# Patient Record
Sex: MALE | Race: WHITE | URBAN - METROPOLITAN AREA
[De-identification: names, ages, dates, MRNs, and addresses within clinical notes are randomized per-mention and may not be internally consistent; named-entity substitution may affect disease eponyms.]

---

## 2023-02-20 ENCOUNTER — OFFICE VISIT (OUTPATIENT)
Dept: ORTHOPEDIC SURGERY | Age: 58
End: 2023-02-20

## 2023-02-20 VITALS — BODY MASS INDEX: 27.17 KG/M2 | HEIGHT: 73 IN | WEIGHT: 205 LBS

## 2023-02-20 DIAGNOSIS — M19.011 DEGENERATIVE JOINT DISEASE OF RIGHT ACROMIOCLAVICULAR JOINT: ICD-10-CM

## 2023-02-20 DIAGNOSIS — S43.401A SPRAIN OF RIGHT SHOULDER, UNSPECIFIED SHOULDER SPRAIN TYPE, INITIAL ENCOUNTER: Primary | ICD-10-CM

## 2023-02-20 DIAGNOSIS — S53.401A SPRAIN OF RIGHT ELBOW, INITIAL ENCOUNTER: ICD-10-CM

## 2023-02-20 NOTE — PROGRESS NOTES
Name: Faina Coleman  YOB: 1965  Gender: male  MRN: [id number]      What: Right shoulder and right elbow pain  How: A fall waterskiing  When: October 2022        HPI: Faina Coleman is a 62 y.o. right-hand-dominant male seen for right shoulder and right elbow problems. I saw him many years ago for his right elbow. He denies any health issues. He is an avid water skier. He was waterskiing quite a bit in September October when he injured his right shoulder and right elbow. He has had previous right shoulder surgery by Dr. Kaitlyn Davila. He complains of bicipital pain in the right arm. He is sore painful and weak. ROS/Meds/PSH/PMH/FH/SH: A ten system review of systems was performed and is negative other than what is in the HPI. Tobacco:  reports that he has never smoked. He has never used smokeless tobacco.  Ht 6' 1\" (1.854 m)   Wt 205 lb (93 kg)   BMI 27.05 kg/m²      Physical Examination:  He is an awake alert pleasant gentleman ambulating without difficulty  He has a full range of cervical spine motion without radicular findings    The left shoulder has 0 to 180 degrees of active and 0 to 180 degrees passive forward elevation. Internal rotation is to T6. External rotation is to 60 degrees at the side. In the 90 degree abducted position 90 degrees of external and 90 degrees internal rotation  The AC joint is non-tender  SC joint is non-tender. Greater tuberosity is non-tender. negative biceps  Negative O'Briens sign  negative lift-off sign  Negative belly press sign  Negative bear huggers sign  negative drop sign  negative hornblower's sign  No posterior glenohumeral joint line tenderness. No evident excessive external rotation  Rotator cuff strength is 5/5.  negative external rotation stress test.   Negative empty can sign  There is no evident anterior or posterior apprehension with a negative sulcus sign.    No instability  negative external and internal Rotation lag sign  Neurovascularly intact. The left elbow has a range of motion of 0 to 135 with 85 degrees of supination and 75 degrees of pronation. Patient is non-tender over the medial epicondyle  non-tender over the ulnar nerve with no evidence of any subluxation or dislocation. Negative tinel at the cubital tunnel  Negative flexor pronator stress test.  Patient is non-tender over the radial tunnel  non-tender over the lateral epicondyle with a negative wrist extensor stress test.   The patient is non-tender when shaking hands. Negative middle finger extension stress test.  The biceps tendon is intact. Present hook test.  Negative reverse randy sign  The left elbow is stable at 0, 30, 70, 90, degrees. No swelling or erythema over the olecranon bursa. Patient has 2+ radial and ulnar pulses and neurovascularly is intact. The right shoulder has well-healed incisions  The right shoulder has 0 to 180 degrees of active and 0 to 180 degrees passive forward elevation. Pain in the overhead position  Internal rotation is to T6. External rotation is to 60 degrees at the side. In the 90 degree abducted position 90 degrees of external and 90 degrees internal rotation  The AC joint is tender  SC joint is non-tender. Greater tuberosity is non-tender. Positive bicipital stress test negative Randy sign  Negative O'Briens sign  negative lift-off sign  Negative belly press sign  Negative bear huggers sign  negative drop sign  negative hornblower's sign  No posterior glenohumeral joint line tenderness. No evident excessive external rotation  Rotator cuff strength is 5-/5 with profound weakness  Positive external rotation stress test.   Positive empty can sign  There is no evident anterior or posterior apprehension with a negative sulcus sign. No instability  negative external and internal Rotation lag sign  Neurovascularly intact.       The right elbow has a range of motion of 0 to 135 with 85 degrees of supination and 75 degrees of pronation. Patient is non-tender over the medial epicondyle  non-tender over the ulnar nerve with no evidence of any subluxation or dislocation. Negative tinel at the cubital tunnel  Negative flexor pronator stress test.  Patient is non-tender over the radial tunnel  non-tender over the lateral epicondyle with a negative wrist extensor stress test.   The patient is non-tender when shaking hands. Negative middle finger extension stress test.  The biceps tendon is intact. Present hook test.  Negative reverse randy sign  The right elbow is stable at 0, 30, 70, 90, degrees. No swelling or erythema over the olecranon bursa. Patient has 2+ radial and ulnar pulses and neurovascularly is intact. Data Reviewed:          XR: AP Y axillary views right shoulder  AP lateral axial views right elbow   Clinical Indication    ICD-10-CM    1. Sprain of right shoulder, unspecified shoulder sprain type, initial encounter  S43.401A MRI SHOULDER RIGHT WO CONTRAST      2. Degenerative joint disease of right acromioclavicular joint  M19.011       3. Sprain of right elbow, initial encounter  S53.401A XR ELBOW RIGHT (MIN 3 VIEWS)         Report: AP Y axillary views right shoulder demonstrate a type II acromion. [facility]. Glenohumeral joint spaces preserved. No fracture. No dislocation. AP lateral and axial views right elbow demonstrate no fracture. Joint spaces preserved. Impression: As above   Brandie Cuba MD              Impression:   1. Sprain of right shoulder, unspecified shoulder sprain type, initial encounter    2. Degenerative joint disease of right acromioclavicular joint    3. Sprain of right elbow, initial encounter       Rule out internal range right shoulder  Previous right shoulder surgery  Rule out internal derangement right elbow    Plan:   I discussed the problem with the patient.   I discussed nonoperative versus operative intervention including injections. Specifically today I discussed the possibility of a rotator cuff and/or bicipital injury in the right shoulder that may require surgery. We will defer that for now. I would like to obtain an MRI of the right shoulder. I will reassess his progress back from the MRI of the right shoulder  4 This is an undiagnosed new problem with uncertain prognosis    Follow up: No follow-ups on file.              Marianne Valdez MD

## 2023-03-01 ENCOUNTER — OFFICE VISIT (OUTPATIENT)
Dept: ORTHOPEDIC SURGERY | Age: 58
End: 2023-03-01

## 2023-03-01 DIAGNOSIS — S43.401A SPRAIN OF RIGHT SHOULDER, UNSPECIFIED SHOULDER SPRAIN TYPE, INITIAL ENCOUNTER: ICD-10-CM

## 2023-03-01 DIAGNOSIS — M19.011 OSTEOARTHRITIS OF RIGHT GLENOHUMERAL JOINT: ICD-10-CM

## 2023-03-01 DIAGNOSIS — M75.21 BICIPITAL TENDINITIS OF RIGHT SHOULDER: ICD-10-CM

## 2023-03-01 DIAGNOSIS — M25.521 RIGHT ELBOW PAIN: ICD-10-CM

## 2023-03-01 DIAGNOSIS — S53.401A SPRAIN OF RIGHT ELBOW, INITIAL ENCOUNTER: Primary | ICD-10-CM

## 2023-03-01 DIAGNOSIS — M19.011 DEGENERATIVE JOINT DISEASE OF RIGHT ACROMIOCLAVICULAR JOINT: ICD-10-CM

## 2023-03-01 PROCEDURE — 99213 OFFICE O/P EST LOW 20 MIN: CPT | Performed by: ORTHOPAEDIC SURGERY

## 2023-03-01 NOTE — PROGRESS NOTES
Name: Tucker Barnhart  YOB: 1965  Gender: male  MRN: [id number]              HPI: Tucker Barnhart is a 62 y.o. right-hand-dominant male seen for right shoulder and right elbow problems. I saw him many years ago for his right elbow. He denies any health issues. He is an avid water skier. He was waterskiing quite a bit in September October when he injured his right shoulder and right elbow. He has had previous right shoulder surgery by Dr. Vicenta Shanks. He complains of bicipital pain in the right arm. He is sore painful and weak. His biggest complaint is pain in the right elbow. He does hearing tests and when he lifts his back it causes pain. That is affecting him the most      ROS/Meds/PSH/PMH/FH/SH: A ten system review of systems was performed and is negative other than what is in the HPI. Tobacco:  reports that he has never smoked. He has never used smokeless tobacco.  There were no vitals taken for this visit. Physical Examination:  He is an awake alert pleasant gentleman ambulating without difficulty  He has a full range of cervical spine motion without radicular findings    The left shoulder has 0 to 180 degrees of active and 0 to 180 degrees passive forward elevation. Internal rotation is to T6. External rotation is to 60 degrees at the side. In the 90 degree abducted position 90 degrees of external and 90 degrees internal rotation  The AC joint is non-tender  SC joint is non-tender. Greater tuberosity is non-tender. negative biceps  Negative O'Briens sign  negative lift-off sign  Negative belly press sign  Negative bear huggers sign  negative drop sign  negative hornblower's sign  No posterior glenohumeral joint line tenderness. No evident excessive external rotation  Rotator cuff strength is 5/5.  negative external rotation stress test.   Negative empty can sign  There is no evident anterior or posterior apprehension with a negative sulcus sign.    No instability  negative external and internal Rotation lag sign  Neurovascularly intact. The left elbow has a range of motion of 0 to 135 with 85 degrees of supination and 75 degrees of pronation. Patient is non-tender over the medial epicondyle  non-tender over the ulnar nerve with no evidence of any subluxation or dislocation. Negative tinel at the cubital tunnel  Negative flexor pronator stress test.  Patient is non-tender over the radial tunnel  non-tender over the lateral epicondyle with a negative wrist extensor stress test.   The patient is non-tender when shaking hands. Negative middle finger extension stress test.  The biceps tendon is intact. Present hook test.  Negative reverse randy sign  The left elbow is stable at 0, 30, 70, 90, degrees. No swelling or erythema over the olecranon bursa. Patient has 2+ radial and ulnar pulses and neurovascularly is intact. The right shoulder has well-healed incisions  The right shoulder has 0 to 180 degrees of active and 0 to 180 degrees passive forward elevation. Pain in the overhead position  Internal rotation is to T6. External rotation is to 60 degrees at the side. In the 90 degree abducted position 90 degrees of external and 90 degrees internal rotation  The AC joint is tender  SC joint is non-tender. Greater tuberosity is non-tender. Positive bicipital stress test negative Randy sign  Negative O'Briens sign  negative lift-off sign  Negative belly press sign  Negative bear huggers sign  negative drop sign  negative hornblower's sign  No posterior glenohumeral joint line tenderness. No evident excessive external rotation  Rotator cuff strength is 5-/5 with profound weakness  Positive external rotation stress test.   Positive empty can sign  There is no evident anterior or posterior apprehension with a negative sulcus sign. No instability  negative external and internal Rotation lag sign  Neurovascularly intact.       The right elbow has a range of motion of 0 to 135 with 85 degrees of supination and 75 degrees of pronation. Patient is non-tender over the medial epicondyle  non-tender over the ulnar nerve with no evidence of any subluxation or dislocation. Negative tinel at the cubital tunnel  Negative flexor pronator stress test.  Patient is non-tender over the radial tunnel  non-tender over the lateral epicondyle with a negative wrist extensor stress test.   The patient is non-tender when shaking hands. Negative middle finger extension stress test.  The biceps tendon is intact. Present hook test.  Negative reverse randy sign  He has pain at the insertion of the biceps tendon  Particularly with resisted supination  The right elbow is stable at 0, 30, 70, 90, degrees. No swelling or erythema over the olecranon bursa. Patient has 2+ radial and ulnar pulses and neurovascularly is intact. Data Reviewed:      MRI of the right shoulder demonstrates a type II acromion. Degenerative changes in the [facility] joint. Increased signal seen in the rotator cuff consistent with a partial-thickness rotator cuff tear. No definitive full-thickness rotator cuff tear. There is fluid around the biceps tendon and signal in the superior labrum suspicious for biceps tendinitis and labral pathology. There are degenerative changes in the glenohumeral joint. Impression:   1. Sprain of right elbow, initial encounter    2. Sprain of right shoulder, unspecified shoulder sprain type, initial encounter    3. Osteoarthritis of right glenohumeral joint    4. Bicipital tendinitis of right shoulder    5. Degenerative joint disease of right acromioclavicular joint       Previous right shoulder surgery  Rule out internal derangement right elbow    Plan:   I discussed the problem with the patient. Today his right shoulder is not giving him trouble. His big issue is pain in the right elbow related to the biceps tendon. We will defer any intervention for now.   I would like to obtain an MRI of his right elbow. I will reassess his progress back following the MRI of the right elbow  3. Acute uncomplicated illness    Follow up: No follow-ups on file.              Turner Madera MD

## 2023-03-08 DIAGNOSIS — S43.401A SPRAIN OF RIGHT SHOULDER, UNSPECIFIED SHOULDER SPRAIN TYPE, INITIAL ENCOUNTER: ICD-10-CM
